# Patient Record
Sex: MALE | URBAN - NONMETROPOLITAN AREA
[De-identification: names, ages, dates, MRNs, and addresses within clinical notes are randomized per-mention and may not be internally consistent; named-entity substitution may affect disease eponyms.]

---

## 2023-11-21 ENCOUNTER — NURSE TRIAGE (OUTPATIENT)
Dept: CALL CENTER | Facility: HOSPITAL | Age: 23
End: 2023-11-21

## 2023-11-22 NOTE — TELEPHONE ENCOUNTER
"Caller states red boil behind ear for a few day's and just opened up. Caller denies fever at this time or other symptoms. Caller states about nickel size.  Caller denies any black tissue or spreading redness/streaking. Caller advised per guideline and aware to monitor area for improvement. Caller advised to call back as needed. Caller advised should  become worse or start having any major symptoms discussed seek emergent care.         Reason for Disposition   [1] Spreading redness around the boil AND [2] no fever    Additional Information   Negative: [1] Widespread rash AND [2] bright red, sunburn-like AND [3] too weak to stand   Negative: Sounds like a life-threatening emergency to the triager   Negative: Painful lump or swelling at opening to anus (rectum)   Negative: Painful lump or swelling at opening to vagina (on labia)   Negative: Painful lump or swelling on scrotum   Negative: Impetigo suspected or diagnosed   Negative: Doesn't match the SYMPTOMS of a boil   Negative: MRSA, questions about (No boil or other skin lesion)   Negative: Widespread red rash   Negative: Black (necrotic) color or blisters develop in wound   Negative: Patient sounds very sick or weak to the triager   Negative: SEVERE pain (e.g., excruciating)   Negative: Red streak from area of infection   Negative: Fever > 100.4 F (38.0 C)   Negative: Boil > 2 inches across (> 5 cm; larger than a golf ball or ping pong ball)   Negative: [1] Boil > 1/2 inch across (> 12 mm; larger than a marble) AND [2] center is soft or pus colored   Negative: [1] Boil > 1/4 inch across (> 6 mm; larger than a pencil eraser) AND [2] on face   Negative: [1] Boil AND [2] diabetes mellitus or weak immune system (e.g., HIV positive, cancer chemo, splenectomy, organ transplant, chronic steroids)   Negative: Boil overlying a joint   Negative: 2 or more boils    Answer Assessment - Initial Assessment Questions  1. APPEARANCE of BOIL: \"What does the boil look like?\"      Red " "and painful to touch   2. LOCATION: \"Where is the boil located?\"       Behind left ear   3. NUMBER: \"How many boils are there?\"       One   4. SIZE: \"How big is the boil?\" (e.g., inches, cm; compare to size of a coin or other object)      Nickel   5. ONSET: \"When did the boil start?\"      A few days ago   6. PAIN: \"Is there any pain?\" If Yes, ask: \"How bad is the pain?\"   (Scale 1-10; or mild, moderate, severe)       Painful only with touch rates two right now   7. FEVER: \"Do you have a fever?\" If Yes, ask: \"What is it, how was it measured, and when did it start?\"       No fever   8. SOURCE: \"Have you been around anyone with boils or other Staph infections?\" \"Have you ever had boils before?\"      Denies   9. OTHER SYMPTOMS: \"Do you have any other symptoms?\" (e.g., shaking chills, weakness, rash elsewhere on body)      Denies   10. PREGNANCY: \"Is there any chance you are pregnant?\" \"When was your last menstrual period?\"    Protocols used: Boil (Skin Abscess)-ADULT-    "